# Patient Record
(demographics unavailable — no encounter records)

---

## 2024-11-08 NOTE — ASSESSMENT
[FreeTextEntry1] : 34M here for evaluation for pelvic pain   R testicular pain  - likely MSK, pelvic pain - Trial sitz baths  - Scrotal sono today - no masses. epididymal head cyst UA UCX f/u 3months

## 2024-11-08 NOTE — HISTORY OF PRESENT ILLNESS
[FreeTextEntry1] : ASM FORD is a 34 year M with no significant PMHx presenting for testicular pain  on 11/08/2024 Referred Dr. Flanagan, colorectal surgery   He reports - has been having testicular pain for some years now  - was evaluated by ED for torsion which was ruled out.  - worse with certain movements, Leaning to one side will feel tension on contralateral testis - 1/24/2022 US scrotum: no torsion, R side- 2 mm simple intratesticular cyst, epididymis with sub centimeter cysts. L side WNL  - Testes size R: 4.0 x 2 x 3.1, L: 3.8 x 2.9 x 2.0 - Reports some incomplete emptying   He denies hematuria dysuria fever chills n/v/d flank pain   Social history; Denies   Family history: Denies   Allergies: NKDA, allergic to peanuts

## 2024-11-08 NOTE — PHYSICAL EXAM
[Normal Appearance] : normal appearance [General Appearance - In No Acute Distress] : no acute distress [Edema] : no peripheral edema [Exaggerated Use Of Accessory Muscles For Inspiration] : no accessory muscle use [Abdomen Soft] : soft [Abdomen Tenderness] : non-tender [Abdomen Hernia] : no hernia was discovered [Urethral Meatus] : meatus normal [Urinary Bladder Findings] : the bladder was normal on palpation [Penis Abnormality] : normal circumcised penis [Scrotum] : the scrotum was normal [Testes Tenderness] : no tenderness of the testes [Testes Mass (___cm)] : there were no testicular masses [Normal Station and Gait] : the gait and station were normal for the patient's age [Oriented To Time, Place, And Person] : oriented to person, place, and time [Affect] : the affect was normal [Chaperone Present] : A chaperone was present in the examining room during all aspects of the physical examination [de-identified] : R testis slightly firmer and assymetrical in comparison to L testis, B/l 16 cc  [FreeTextEntry2] : Antonio Smith PA-C [FreeTextEntry3] : Exam performed by Dr. Mendiola

## 2024-11-08 NOTE — END OF VISIT
[FreeTextEntry3] : I, Dr. Mendiola, personally performed the evaluation and management (E/M) services for this established patient who presents today with (a) new problem(s)/exacerbation of (an) existing condition(s). That E/M includes conducting the clinically appropriate interval history &/or exam, assessing all new/exacerbated conditions, and establishing a new plan of care. Today, my TRAN, Antonio Smith, was here to observe my evaluation and management service for this new problem/exacerbated condition and follow the plan of care established by me going forward
[Consultation] : a consultation visit